# Patient Record
Sex: FEMALE | Race: WHITE
[De-identification: names, ages, dates, MRNs, and addresses within clinical notes are randomized per-mention and may not be internally consistent; named-entity substitution may affect disease eponyms.]

---

## 2019-11-06 NOTE — RAD
Exam:

Right foot 3 views:





HISTORY:

Right foot pain



COMPARISON:

None



FINDINGS:



No evidence for fracture, dislocation, or other significant acute osseous abnormality.



IMPRESSION:

No acute process.



Given history, if the patient has persistent or worsening pain consideration for follow-up MRI which 
may well demonstrate stress-related changes that would not be evident on plain film evaluation



Reported By: Bandar Briscoe 

Electronically Signed:  11/6/2019 4:21 PM

## 2022-05-26 ENCOUNTER — HOSPITAL ENCOUNTER (OUTPATIENT)
Dept: HOSPITAL 92 - BICRAD | Age: 22
Discharge: HOME | End: 2022-05-26
Attending: NURSE PRACTITIONER
Payer: COMMERCIAL

## 2022-05-26 DIAGNOSIS — M62.830: ICD-10-CM

## 2022-05-26 DIAGNOSIS — M54.50: Primary | ICD-10-CM

## 2022-05-26 PROCEDURE — 72100 X-RAY EXAM L-S SPINE 2/3 VWS: CPT

## 2022-06-08 ENCOUNTER — HOSPITAL ENCOUNTER (OUTPATIENT)
Dept: HOSPITAL 92 - TBSIIMAG | Age: 22
Discharge: HOME | End: 2022-06-08
Attending: NURSE PRACTITIONER
Payer: COMMERCIAL

## 2022-06-08 DIAGNOSIS — M62.830: ICD-10-CM

## 2022-06-08 DIAGNOSIS — M47.816: ICD-10-CM

## 2022-06-08 DIAGNOSIS — M54.50: Primary | ICD-10-CM

## 2022-06-08 PROCEDURE — 72148 MRI LUMBAR SPINE W/O DYE: CPT
